# Patient Record
Sex: MALE | Race: WHITE | NOT HISPANIC OR LATINO | Employment: FULL TIME | URBAN - METROPOLITAN AREA
[De-identification: names, ages, dates, MRNs, and addresses within clinical notes are randomized per-mention and may not be internally consistent; named-entity substitution may affect disease eponyms.]

---

## 2019-04-19 ENCOUNTER — OFFICE VISIT (OUTPATIENT)
Dept: URGENT CARE | Facility: CLINIC | Age: 29
End: 2019-04-19
Payer: COMMERCIAL

## 2019-04-19 VITALS
RESPIRATION RATE: 16 BRPM | OXYGEN SATURATION: 97 % | SYSTOLIC BLOOD PRESSURE: 141 MMHG | WEIGHT: 227 LBS | DIASTOLIC BLOOD PRESSURE: 79 MMHG | HEIGHT: 75 IN | TEMPERATURE: 103 F | BODY MASS INDEX: 28.23 KG/M2 | HEART RATE: 90 BPM

## 2019-04-19 DIAGNOSIS — J02.9 SORE THROAT: ICD-10-CM

## 2019-04-19 DIAGNOSIS — J02.9 ACUTE PHARYNGITIS, UNSPECIFIED ETIOLOGY: Primary | ICD-10-CM

## 2019-04-19 DIAGNOSIS — R50.9 FEVER, UNSPECIFIED FEVER CAUSE: ICD-10-CM

## 2019-04-19 LAB
CTP QC/QA: YES
FLUAV AG NPH QL: NEGATIVE
FLUBV AG NPH QL: NEGATIVE
HETEROPH AB SER QL: NEGATIVE
S PYO RRNA THROAT QL PROBE: NEGATIVE

## 2019-04-19 PROCEDURE — 3008F PR BODY MASS INDEX (BMI) DOCUMENTED: ICD-10-PCS | Mod: CPTII,S$GLB,, | Performed by: EMERGENCY MEDICINE

## 2019-04-19 PROCEDURE — 86308 POCT INFECTIOUS MONONUCLEOSIS: ICD-10-PCS | Mod: QW,S$GLB,, | Performed by: EMERGENCY MEDICINE

## 2019-04-19 PROCEDURE — 87880 POCT RAPID STREP A: ICD-10-PCS | Mod: QW,S$GLB,, | Performed by: EMERGENCY MEDICINE

## 2019-04-19 PROCEDURE — 86308 HETEROPHILE ANTIBODY SCREEN: CPT | Mod: QW,S$GLB,, | Performed by: EMERGENCY MEDICINE

## 2019-04-19 PROCEDURE — 87880 STREP A ASSAY W/OPTIC: CPT | Mod: QW,S$GLB,, | Performed by: EMERGENCY MEDICINE

## 2019-04-19 PROCEDURE — 99203 PR OFFICE/OUTPT VISIT, NEW, LEVL III, 30-44 MIN: ICD-10-PCS | Mod: 25,S$GLB,, | Performed by: EMERGENCY MEDICINE

## 2019-04-19 PROCEDURE — 96372 PR INJECTION,THERAP/PROPH/DIAG2ST, IM OR SUBCUT: ICD-10-PCS | Mod: S$GLB,,, | Performed by: EMERGENCY MEDICINE

## 2019-04-19 PROCEDURE — 96372 THER/PROPH/DIAG INJ SC/IM: CPT | Mod: S$GLB,,, | Performed by: EMERGENCY MEDICINE

## 2019-04-19 PROCEDURE — 99203 OFFICE O/P NEW LOW 30 MIN: CPT | Mod: 25,S$GLB,, | Performed by: EMERGENCY MEDICINE

## 2019-04-19 PROCEDURE — 87804 POCT INFLUENZA A/B: ICD-10-PCS | Mod: 59,QW,S$GLB, | Performed by: EMERGENCY MEDICINE

## 2019-04-19 PROCEDURE — 87804 INFLUENZA ASSAY W/OPTIC: CPT | Mod: QW,S$GLB,, | Performed by: EMERGENCY MEDICINE

## 2019-04-19 PROCEDURE — 3008F BODY MASS INDEX DOCD: CPT | Mod: CPTII,S$GLB,, | Performed by: EMERGENCY MEDICINE

## 2019-04-19 RX ORDER — IBUPROFEN 200 MG
600 TABLET ORAL
Status: COMPLETED | OUTPATIENT
Start: 2019-04-19 | End: 2019-04-19

## 2019-04-19 RX ORDER — BETAMETHASONE SODIUM PHOSPHATE AND BETAMETHASONE ACETATE 3; 3 MG/ML; MG/ML
9 INJECTION, SUSPENSION INTRA-ARTICULAR; INTRALESIONAL; INTRAMUSCULAR; SOFT TISSUE
Status: COMPLETED | OUTPATIENT
Start: 2019-04-19 | End: 2019-04-19

## 2019-04-19 RX ADMIN — BETAMETHASONE SODIUM PHOSPHATE AND BETAMETHASONE ACETATE 9 MG: 3; 3 INJECTION, SUSPENSION INTRA-ARTICULAR; INTRALESIONAL; INTRAMUSCULAR; SOFT TISSUE at 11:04

## 2019-04-19 RX ADMIN — Medication 600 MG: at 11:04

## 2019-04-19 NOTE — PATIENT INSTRUCTIONS
Rest and hydrate with plenty of fluids  Celestone shot 1.5 cc given in clinic  Swabs and tests have been negative in clinic.  Take ibuprofen 600 mg every 6 hr for headache, sore throat, joint pain  Okay to take Tylenol 650 mg every 4-6 hours for fever, headache, joint pain  Only fill the antibiotic prescription if not very much improved in 2-3 days with above treatment  See viral pharyngitis sheet        Viral Pharyngitis (Sore Throat)    You (or your child, if your child is the patient) have pharyngitis (sore throat). This infection is caused by a virus. It can cause throat pain that is worse when swallowing, aching all over, headache, and fever. The infection may be spread by coughing, kissing, or touching others after touching your mouth or nose. Antibiotic medications do not work against viruses, so they are not used for treating this condition.  Home care  · If your symptoms are severe, rest at home. Return to work or school when you feel well enough.   · Drink plenty of fluids to avoid dehydration.  · For children: Use acetaminophen for fever, fussiness or discomfort. In infants over six months of age, you may use ibuprofen instead of acetaminophen. (NOTE: If your child has chronic liver or kidney disease or ever had a stomach ulcer or GI bleeding, talk with your doctor before using these medicines.) (NOTE: Aspirin should never be used in anyone under 18 years of age who is ill with a fever. It may cause severe liver damage.)   · For adults: You may use acetaminophen or ibuprofen to control pain or fever, unless another medicine was prescribed for this. (NOTE: If you have chronic liver or kidney disease or ever had a stomach ulcer or GI bleeding, talk with your doctor before using these medicines.)  · Throat lozenges or numbing throat sprays can help reduce pain. Gargling with warm salt water will also help reduce throat pain. For this, dissolve 1/2 teaspoon of salt in 1 glass of warm water. To help soothe a  sore throat, children can sip on juice or a popsicle. Children 5 years and older can also suck on a lollipop or hard candy.  · Avoid salty or spicy foods, which can be irritating to the throat.  Follow-up care  Follow up with your healthcare provider or our staff if you are not improving over the next week.  When to seek medical advice  Call your healthcare provider right away if any of these occur:  · Fever as directed by your doctor.  For children, seek care if:  ¨ Your child is of any age and has repeated fevers above 104°F (40°C).  ¨ Your child is younger than 2 years of age and has a fever of 100.4°F (38°C) that continues for more than 1 day.  ¨ Your child is 2 years old or older and has a fever of 100.4°F (38°C) that continues for more than 3 days.  · New or worsening ear pain, sinus pain, or headache  · Painful lumps in the back of neck  · Stiff neck  · Lymph nodes are getting larger  · Inability to swallow liquids, excessive drooling, or inability to open mouth wide due to throat pain  · Signs of dehydration (very dark urine or no urine, sunken eyes, dizziness)  · Trouble breathing or noisy breathing  · Muffled voice  · New rash  · Child appears to be getting sicker  Date Last Reviewed: 4/13/2015  © 7891-6324 [employer]. [address] [place] [address], [place] [address]. All rights reserved. This information is not intended as a substitute for professional medical care. Always follow your healthcare professional's instructions.

## 2019-04-19 NOTE — PROGRESS NOTES
"Subjective:       Patient ID: Jeremi Azul is a 28 y.o. male.    Vitals:  height is 6' 3" (1.905 m) and weight is 103 kg (227 lb). His temperature is 103 °F (39.4 °C) (abnormal). His blood pressure is 141/79 (abnormal) and his pulse is 90. His respiration is 16 and oxygen saturation is 97%.     Chief Complaint: Sore Throat    Pt c/o sore throat along with fever   Onset four days.  The patient's wife has been sick and was diagnosis with viral pharyngitis and is improving slowly.  She also swab negative for strep.  She did not have as high of fever.  He reports sore throat on Monday that progressed to Tuesday and Wednesday when he developed a very low-grade fever that improved with ibuprofen.  The sore throat has continued and then last night and this morning is when he developed a high fever with headache and chills and body aches.  No weakness or near syncope no chest pain no shortness of breath.  To note, the patient denies lymph node swelling and any significant runny nose or cough.    Sore Throat    This is a new problem. Episode onset: four days. The problem has been gradually worsening. The maximum temperature recorded prior to his arrival was 103 - 104 F. The patient is experiencing no pain. Associated symptoms include coughing and headaches. Pertinent negatives include no congestion, ear pain, shortness of breath, stridor or vomiting. He has tried NSAIDs for the symptoms. The treatment provided no relief.       Constitution: Positive for fever. Negative for chills, sweating and fatigue.   HENT: Positive for sore throat. Negative for ear pain, congestion, sinus pain, sinus pressure and voice change.    Neck: Negative for painful lymph nodes.   Eyes: Negative for eye redness.   Respiratory: Positive for cough. Negative for chest tightness, sputum production, bloody sputum, COPD, shortness of breath, stridor, wheezing and asthma.    Gastrointestinal: Negative for nausea and vomiting.   Musculoskeletal: " Negative for muscle ache.   Skin: Negative for rash.   Allergic/Immunologic: Negative for seasonal allergies and asthma.   Neurological: Positive for headaches.   Hematologic/Lymphatic: Negative for swollen lymph nodes.       Objective:      Physical Exam   Constitutional: He is oriented to person, place, and time. He appears well-developed and well-nourished. He is cooperative.  Non-toxic appearance. He does not appear ill. No distress.   HENT:   Head: Normocephalic and atraumatic.   Right Ear: Hearing, tympanic membrane, external ear and ear canal normal.   Left Ear: Hearing, tympanic membrane, external ear and ear canal normal.   Nose: Nose normal. No mucosal edema, rhinorrhea or nasal deformity. No epistaxis. Right sinus exhibits no maxillary sinus tenderness and no frontal sinus tenderness. Left sinus exhibits no maxillary sinus tenderness and no frontal sinus tenderness.   Mouth/Throat: Uvula is midline and mucous membranes are normal. No trismus in the jaw. Normal dentition. No uvula swelling. No oropharyngeal exudate or posterior oropharyngeal erythema.   Posterior or pharyngeal erythema.   Eyes: Conjunctivae and lids are normal. No scleral icterus.   Sclera clear bilat   Neck: Trachea normal, full passive range of motion without pain and phonation normal. Neck supple.   Cardiovascular: Regular rhythm, normal heart sounds, intact distal pulses and normal pulses.   Tachycardic, febrile   Pulmonary/Chest: Effort normal and breath sounds normal. No respiratory distress.   Abdominal: Soft. Normal appearance and bowel sounds are normal. There is no tenderness.   Musculoskeletal: Normal range of motion. He exhibits no edema or deformity.   Neurological: He is alert and oriented to person, place, and time. He exhibits normal muscle tone. Coordination normal.   Skin: Skin is warm, dry and intact. He is not diaphoretic. No pallor.   Psychiatric: He has a normal mood and affect. His speech is normal and behavior is  normal. Cognition and memory are normal.   Nursing note and vitals reviewed.      Office Visit on 04/19/2019   Component Date Value Ref Range Status    Rapid Strep A Screen 04/19/2019 Negative  Negative Final     Acceptable 04/19/2019 Yes   Final    Rapid Influenza A Ag 04/19/2019 Negative  Negative Final    Rapid Influenza B Ag 04/19/2019 Negative  Negative Final     Acceptable 04/19/2019 Yes   Final    Monospot 04/19/2019 Negative  Negative Final     Acceptable 04/19/2019 Yes   Final       Monospot negative  Flu swab negative  Assessment:       1. Acute pharyngitis, unspecified etiology    2. Sore throat    3. Fever, unspecified fever cause        Plan:         Acute pharyngitis, unspecified etiology    Sore throat  -     POCT rapid strep A  -     POCT Influenza A/B  -     POCT Infectious mononucleosis antibody    Fever, unspecified fever cause    Other orders  -     ibuprofen tablet 600 mg  -     betamethasone acetate-betamethasone sodium phosphate injection 9 mg          Patient Instructions   Rest and hydrate with plenty of fluids  Celestone shot 1.5 cc given in clinic  Swabs and tests have been negative in clinic.  Take ibuprofen 600 mg every 6 hr for headache, sore throat, joint pain  Okay to take Tylenol 650 mg every 4-6 hours for fever, headache, joint pain  Only fill the antibiotic prescription if not very much improved in 2-3 days with above treatment  See viral pharyngitis sheet        Viral Pharyngitis (Sore Throat)    You (or your child, if your child is the patient) have pharyngitis (sore throat). This infection is caused by a virus. It can cause throat pain that is worse when swallowing, aching all over, headache, and fever. The infection may be spread by coughing, kissing, or touching others after touching your mouth or nose. Antibiotic medications do not work against viruses, so they are not used for treating this condition.  Home care  · If your  symptoms are severe, rest at home. Return to work or school when you feel well enough.   · Drink plenty of fluids to avoid dehydration.  · For children: Use acetaminophen for fever, fussiness or discomfort. In infants over six months of age, you may use ibuprofen instead of acetaminophen. (NOTE: If your child has chronic liver or kidney disease or ever had a stomach ulcer or GI bleeding, talk with your doctor before using these medicines.) (NOTE: Aspirin should never be used in anyone under 18 years of age who is ill with a fever. It may cause severe liver damage.)   · For adults: You may use acetaminophen or ibuprofen to control pain or fever, unless another medicine was prescribed for this. (NOTE: If you have chronic liver or kidney disease or ever had a stomach ulcer or GI bleeding, talk with your doctor before using these medicines.)  · Throat lozenges or numbing throat sprays can help reduce pain. Gargling with warm salt water will also help reduce throat pain. For this, dissolve 1/2 teaspoon of salt in 1 glass of warm water. To help soothe a sore throat, children can sip on juice or a popsicle. Children 5 years and older can also suck on a lollipop or hard candy.  · Avoid salty or spicy foods, which can be irritating to the throat.  Follow-up care  Follow up with your healthcare provider or our staff if you are not improving over the next week.  When to seek medical advice  Call your healthcare provider right away if any of these occur:  · Fever as directed by your doctor.  For children, seek care if:  ¨ Your child is of any age and has repeated fevers above 104°F (40°C).  ¨ Your child is younger than 2 years of age and has a fever of 100.4°F (38°C) that continues for more than 1 day.  ¨ Your child is 2 years old or older and has a fever of 100.4°F (38°C) that continues for more than 3 days.  · New or worsening ear pain, sinus pain, or headache  · Painful lumps in the back of neck  · Stiff neck  · Lymph nodes  are getting larger  · Inability to swallow liquids, excessive drooling, or inability to open mouth wide due to throat pain  · Signs of dehydration (very dark urine or no urine, sunken eyes, dizziness)  · Trouble breathing or noisy breathing  · Muffled voice  · New rash  · Child appears to be getting sicker  Date Last Reviewed: 4/13/2015  © 3689-4877 [employer] [place]. [address] [place] [address], [place] [address]. All rights reserved. This information is not intended as a substitute for professional medical care. Always follow your healthcare professional's instructions.

## 2020-12-07 ENCOUNTER — CLINICAL SUPPORT (OUTPATIENT)
Dept: URGENT CARE | Facility: CLINIC | Age: 30
End: 2020-12-07
Payer: COMMERCIAL

## 2020-12-07 VITALS — TEMPERATURE: 99 F

## 2020-12-07 DIAGNOSIS — U07.1 COVID-19: Primary | ICD-10-CM

## 2020-12-07 LAB
CTP QC/QA: YES
SARS-COV-2 RDRP RESP QL NAA+PROBE: NEGATIVE

## 2020-12-07 PROCEDURE — U0002 COVID-19 LAB TEST NON-CDC: HCPCS | Mod: QW,S$GLB,, | Performed by: PHYSICIAN ASSISTANT

## 2020-12-07 PROCEDURE — U0002: ICD-10-PCS | Mod: QW,S$GLB,, | Performed by: PHYSICIAN ASSISTANT

## 2020-12-07 NOTE — PATIENT INSTRUCTIONS
"Your test was NEGATIVE for COVID-19 (coronavirus).      You may leave home and/or return to work when the following conditions are met:   24 hours fever free without fever-reducing medications AND   Improved symptoms   You have not met the conditions of a closed exposure       A "close exposure" is defined as anyone who has had an exposure (masked or unmasked) to a known COVID -19 positive person within 6 ft for longer than 15 minutes. If your exposure meets this definition you are required by CDC guidelines to quarantine for 14 days from time of exposure regardless of test status.      Additional instructions:  · Social distance per your local guidelines  · Call ahead before visiting your doctor.  · Wear a facemask when around others who do not live in your household.  · Cover your coughs and sneezes.  · Wash your hands often with soap and water; hand  can be used, too.      If your symptoms worsen or if you have any other concerns, please contact [facility] at [phone].     Sincerely,    Angela Ryan MA    "

## 2021-02-25 ENCOUNTER — CLINICAL SUPPORT (OUTPATIENT)
Dept: URGENT CARE | Facility: CLINIC | Age: 31
End: 2021-02-25
Payer: COMMERCIAL

## 2021-02-25 VITALS — TEMPERATURE: 97 F

## 2021-02-25 DIAGNOSIS — Z11.52 ENCOUNTER FOR SCREENING FOR COVID-19: Primary | ICD-10-CM

## 2021-02-25 LAB
CTP QC/QA: YES
SARS-COV-2 RDRP RESP QL NAA+PROBE: NEGATIVE

## 2021-02-25 PROCEDURE — U0002: ICD-10-PCS | Mod: QW,S$GLB,, | Performed by: NURSE PRACTITIONER

## 2021-02-25 PROCEDURE — U0002 COVID-19 LAB TEST NON-CDC: HCPCS | Mod: QW,S$GLB,, | Performed by: NURSE PRACTITIONER

## 2021-04-09 ENCOUNTER — IMMUNIZATION (OUTPATIENT)
Dept: FAMILY MEDICINE | Facility: CLINIC | Age: 31
End: 2021-04-09
Payer: COMMERCIAL

## 2021-04-09 DIAGNOSIS — Z23 NEED FOR VACCINATION: Primary | ICD-10-CM

## 2021-04-09 PROCEDURE — 91300 COVID-19, MRNA, LNP-S, PF, 30 MCG/0.3 ML DOSE VACCINE: CPT | Mod: PBBFAC | Performed by: FAMILY MEDICINE

## 2021-04-30 ENCOUNTER — IMMUNIZATION (OUTPATIENT)
Dept: FAMILY MEDICINE | Facility: CLINIC | Age: 31
End: 2021-04-30
Payer: COMMERCIAL

## 2021-04-30 DIAGNOSIS — Z23 NEED FOR VACCINATION: Primary | ICD-10-CM

## 2021-04-30 PROCEDURE — 91300 COVID-19, MRNA, LNP-S, PF, 30 MCG/0.3 ML DOSE VACCINE: CPT | Mod: PBBFAC | Performed by: NURSE ANESTHETIST, CERTIFIED REGISTERED

## 2021-04-30 PROCEDURE — 0002A COVID-19, MRNA, LNP-S, PF, 30 MCG/0.3 ML DOSE VACCINE: CPT | Mod: PBBFAC | Performed by: NURSE ANESTHETIST, CERTIFIED REGISTERED

## 2023-07-10 PROBLEM — G43.909 MIGRAINE: Status: ACTIVE | Noted: 2023-07-10

## 2023-07-11 NOTE — PROGRESS NOTES
"Chart has been dictated using voice recognition software.  It is not been reviewed carefully for any transcriptional errors due to this technology.   Subjective:   Patient ID:  Jeremi Azul is a 32 y.o. male who presents for evaluation of No chief complaint on file.      HPI:  Patient with family history of cardiac disease and migraine headaches is self-referred for evaluation of high heart rate.    Patient notes resting heart rates to 1340 frequently at rest with slow recover.  Not associated with chest discomfort.  No palpitations.   Patient denies any dyspnea at rest or on exertion, orthopnea, PND, or edema.  Patient denies any lightheadedness or syncope.  When he works out ("not very often") he notes his heart rate will increase and the force will increase so that he feels palpitaitons.  Will a feeling of palpitations with weaknes last a sec or 2 every couple of months     Cardiac risk factors: positive family history (for tachyarrhythmias requiring ablation as well as early CAD)    Patient denies any smoking or drug use.    No past medical history on file.    Past Surgical History:   Procedure Laterality Date    none         Social History     Tobacco Use    Smoking status: Never    Smokeless tobacco: Never   Substance Use Topics    Alcohol use: No       Outpatient Medications Prior to Visit   Medication Sig Dispense Refill    sumatriptan 5 mg/actuation Spry 5 mg by Nasal route daily as needed (may repeat one time 2 hours later with maximum 10 mg per day). 1 each 0    verapamil (CALAN) 40 MG Tab Take 1 tablet (40 mg total) by mouth once daily. 30 tablet 3     No facility-administered medications prior to visit.       Review of patient's allergies indicates:  No Known Allergies    ROS - The patient's review of systems is negative for any significant constitutional, respiratory, endocrine, hematologic, musculoskeletal or neurologic symptoms.   Objective:   Physical Exam    Lab Results   Component Value " Date    WBC 5.33 07/19/2016    HGB 15.8 07/19/2016    HCT 45.4 07/19/2016    MCV 80 (L) 07/19/2016     07/19/2016       Lab Results   Component Value Date     07/19/2016    K 4.7 07/19/2016    BUN 12 07/19/2016    CREATININE 1.1 07/19/2016    GLU 67 (L) 07/19/2016    HGB 15.8 07/19/2016    HCT 45.4 07/19/2016     07/19/2016     ECG (today) showed normal sinus rhythm and was a normal EKG.  Assessment:     1. Tachycardia    2. Family history of heart disease    3. Migraine without status migrainosus, not intractable, unspecified migraine type      Patient presents with tachycardia of unknown etiology that occurs essentially with exercise.  This may be due to deconditioning.  His cardiac exam does not reveal any obvious abnormalities.  His ECG is normal.  Therefore, the patient will be placed on a Holter monitor to get objective evidence of what his heart rate does.  He has been instructed to keep a very good diary, particularly trying to correlate his heart rate by his computer watch his heart rate on his Holter monitor.  Additionally, given the patient's concern for being at risk of cardiovascular disease with the family history of early coronary disease, the patient will be sent for fasting lipid profile as well as CBC and CMP.  Cardiac risk factor management will be implemented depending on the results of those tests.  It should be noted the patient no longer has migraine headaches and has stopped all medications.    Unless there are intervening problems, patient should return for re-evaluation in 6 weeks.       Plan:     Diagnoses and all orders for this visit:    Tachycardia    Family history of heart disease    Migraine without status migrainosus, not intractable, unspecified migraine type          Maged Aguilar MD  Consultative Cardiology

## 2023-07-14 ENCOUNTER — OFFICE VISIT (OUTPATIENT)
Dept: CARDIOLOGY | Facility: CLINIC | Age: 33
End: 2023-07-14
Payer: COMMERCIAL

## 2023-07-14 VITALS
SYSTOLIC BLOOD PRESSURE: 126 MMHG | DIASTOLIC BLOOD PRESSURE: 76 MMHG | WEIGHT: 235.88 LBS | HEIGHT: 74 IN | OXYGEN SATURATION: 96 % | BODY MASS INDEX: 30.27 KG/M2 | HEART RATE: 70 BPM

## 2023-07-14 DIAGNOSIS — Z82.49 FAMILY HISTORY OF HEART DISEASE: ICD-10-CM

## 2023-07-14 DIAGNOSIS — G43.909 MIGRAINE WITHOUT STATUS MIGRAINOSUS, NOT INTRACTABLE, UNSPECIFIED MIGRAINE TYPE: ICD-10-CM

## 2023-07-14 DIAGNOSIS — R00.0 TACHYCARDIA: Primary | ICD-10-CM

## 2023-07-14 PROCEDURE — 3008F BODY MASS INDEX DOCD: CPT | Mod: CPTII,S$GLB,, | Performed by: INTERNAL MEDICINE

## 2023-07-14 PROCEDURE — 93000 EKG 12-LEAD: ICD-10-PCS | Mod: S$GLB,,, | Performed by: INTERNAL MEDICINE

## 2023-07-14 PROCEDURE — 3074F PR MOST RECENT SYSTOLIC BLOOD PRESSURE < 130 MM HG: ICD-10-PCS | Mod: CPTII,S$GLB,, | Performed by: INTERNAL MEDICINE

## 2023-07-14 PROCEDURE — 3008F PR BODY MASS INDEX (BMI) DOCUMENTED: ICD-10-PCS | Mod: CPTII,S$GLB,, | Performed by: INTERNAL MEDICINE

## 2023-07-14 PROCEDURE — 3078F DIAST BP <80 MM HG: CPT | Mod: CPTII,S$GLB,, | Performed by: INTERNAL MEDICINE

## 2023-07-14 PROCEDURE — 1160F RVW MEDS BY RX/DR IN RCRD: CPT | Mod: CPTII,S$GLB,, | Performed by: INTERNAL MEDICINE

## 2023-07-14 PROCEDURE — 1159F PR MEDICATION LIST DOCUMENTED IN MEDICAL RECORD: ICD-10-PCS | Mod: CPTII,S$GLB,, | Performed by: INTERNAL MEDICINE

## 2023-07-14 PROCEDURE — 3078F PR MOST RECENT DIASTOLIC BLOOD PRESSURE < 80 MM HG: ICD-10-PCS | Mod: CPTII,S$GLB,, | Performed by: INTERNAL MEDICINE

## 2023-07-14 PROCEDURE — 3074F SYST BP LT 130 MM HG: CPT | Mod: CPTII,S$GLB,, | Performed by: INTERNAL MEDICINE

## 2023-07-14 PROCEDURE — 93000 ELECTROCARDIOGRAM COMPLETE: CPT | Mod: S$GLB,,, | Performed by: INTERNAL MEDICINE

## 2023-07-14 PROCEDURE — 99204 PR OFFICE/OUTPT VISIT, NEW, LEVL IV, 45-59 MIN: ICD-10-PCS | Mod: S$GLB,,, | Performed by: INTERNAL MEDICINE

## 2023-07-14 PROCEDURE — 99204 OFFICE O/P NEW MOD 45 MIN: CPT | Mod: S$GLB,,, | Performed by: INTERNAL MEDICINE

## 2023-07-14 PROCEDURE — 1160F PR REVIEW ALL MEDS BY PRESCRIBER/CLIN PHARMACIST DOCUMENTED: ICD-10-PCS | Mod: CPTII,S$GLB,, | Performed by: INTERNAL MEDICINE

## 2023-07-14 PROCEDURE — 99999 PR PBB SHADOW E&M-EST. PATIENT-LVL III: CPT | Mod: PBBFAC,,, | Performed by: INTERNAL MEDICINE

## 2023-07-14 PROCEDURE — 99999 PR PBB SHADOW E&M-EST. PATIENT-LVL III: ICD-10-PCS | Mod: PBBFAC,,, | Performed by: INTERNAL MEDICINE

## 2023-07-14 PROCEDURE — 1159F MED LIST DOCD IN RCRD: CPT | Mod: CPTII,S$GLB,, | Performed by: INTERNAL MEDICINE

## 2023-07-14 NOTE — PATIENT INSTRUCTIONS
You need to get your cholesterol checked after a 12 hour fast.   No food or drink other than water, and any medication that needs to be taken, for 12 hours prior to the blood test.  You can eat as soon as you want after the sample blood sample is taken.

## 2023-07-14 NOTE — Clinical Note
Thank you for referring Jeremi Azul for evaluation of tachycardia. Please see my note for details of this encounter. If you have any questions, please contact me.  Thank you again for the referral.

## 2023-07-17 DIAGNOSIS — R00.0 TACHYCARDIA: Primary | ICD-10-CM

## 2023-07-18 ENCOUNTER — LAB VISIT (OUTPATIENT)
Dept: LAB | Facility: HOSPITAL | Age: 33
End: 2023-07-18
Attending: INTERNAL MEDICINE
Payer: COMMERCIAL

## 2023-07-18 DIAGNOSIS — R00.0 TACHYCARDIA: ICD-10-CM

## 2023-07-18 LAB
ALBUMIN SERPL BCP-MCNC: 4.2 G/DL (ref 3.5–5.2)
ALP SERPL-CCNC: 61 U/L (ref 55–135)
ALT SERPL W/O P-5'-P-CCNC: 34 U/L (ref 10–44)
ANION GAP SERPL CALC-SCNC: 10 MMOL/L (ref 8–16)
AST SERPL-CCNC: 19 U/L (ref 10–40)
BASOPHILS # BLD AUTO: 0.04 K/UL (ref 0–0.2)
BASOPHILS NFR BLD: 1 % (ref 0–1.9)
BILIRUB SERPL-MCNC: 0.7 MG/DL (ref 0.1–1)
BUN SERPL-MCNC: 12 MG/DL (ref 6–20)
CALCIUM SERPL-MCNC: 9.3 MG/DL (ref 8.7–10.5)
CHLORIDE SERPL-SCNC: 107 MMOL/L (ref 95–110)
CHOLEST SERPL-MCNC: 183 MG/DL (ref 120–199)
CHOLEST/HDLC SERPL: 5.2 {RATIO} (ref 2–5)
CO2 SERPL-SCNC: 25 MMOL/L (ref 23–29)
CREAT SERPL-MCNC: 1.1 MG/DL (ref 0.5–1.4)
DIFFERENTIAL METHOD: NORMAL
EOSINOPHIL # BLD AUTO: 0.1 K/UL (ref 0–0.5)
EOSINOPHIL NFR BLD: 2.4 % (ref 0–8)
ERYTHROCYTE [DISTWIDTH] IN BLOOD BY AUTOMATED COUNT: 12.8 % (ref 11.5–14.5)
EST. GFR  (NO RACE VARIABLE): >60 ML/MIN/1.73 M^2
GLUCOSE SERPL-MCNC: 97 MG/DL (ref 70–110)
HCT VFR BLD AUTO: 44.7 % (ref 40–54)
HDLC SERPL-MCNC: 35 MG/DL (ref 40–75)
HDLC SERPL: 19.1 % (ref 20–50)
HGB BLD-MCNC: 15.1 G/DL (ref 14–18)
IMM GRANULOCYTES # BLD AUTO: 0.01 K/UL (ref 0–0.04)
IMM GRANULOCYTES NFR BLD AUTO: 0.2 % (ref 0–0.5)
LDLC SERPL CALC-MCNC: 133.4 MG/DL (ref 63–159)
LYMPHOCYTES # BLD AUTO: 1.4 K/UL (ref 1–4.8)
LYMPHOCYTES NFR BLD: 32.7 % (ref 18–48)
MCH RBC QN AUTO: 27.7 PG (ref 27–31)
MCHC RBC AUTO-ENTMCNC: 33.8 G/DL (ref 32–36)
MCV RBC AUTO: 82 FL (ref 82–98)
MONOCYTES # BLD AUTO: 0.4 K/UL (ref 0.3–1)
MONOCYTES NFR BLD: 8.9 % (ref 4–15)
NEUTROPHILS # BLD AUTO: 2.3 K/UL (ref 1.8–7.7)
NEUTROPHILS NFR BLD: 54.8 % (ref 38–73)
NONHDLC SERPL-MCNC: 148 MG/DL
NRBC BLD-RTO: 0 /100 WBC
PLATELET # BLD AUTO: 216 K/UL (ref 150–450)
PMV BLD AUTO: 9.4 FL (ref 9.2–12.9)
POTASSIUM SERPL-SCNC: 4.1 MMOL/L (ref 3.5–5.1)
PROT SERPL-MCNC: 7.4 G/DL (ref 6–8.4)
RBC # BLD AUTO: 5.46 M/UL (ref 4.6–6.2)
SODIUM SERPL-SCNC: 142 MMOL/L (ref 136–145)
TRIGL SERPL-MCNC: 73 MG/DL (ref 30–150)
WBC # BLD AUTO: 4.16 K/UL (ref 3.9–12.7)

## 2023-07-18 PROCEDURE — 80053 COMPREHEN METABOLIC PANEL: CPT | Performed by: INTERNAL MEDICINE

## 2023-07-18 PROCEDURE — 85025 COMPLETE CBC W/AUTO DIFF WBC: CPT | Performed by: INTERNAL MEDICINE

## 2023-07-18 PROCEDURE — 36415 COLL VENOUS BLD VENIPUNCTURE: CPT | Performed by: INTERNAL MEDICINE

## 2023-07-18 PROCEDURE — 80061 LIPID PANEL: CPT | Performed by: INTERNAL MEDICINE

## 2023-11-18 ENCOUNTER — OFFICE VISIT (OUTPATIENT)
Dept: URGENT CARE | Facility: CLINIC | Age: 33
End: 2023-11-18
Payer: COMMERCIAL

## 2023-11-18 VITALS
OXYGEN SATURATION: 96 % | HEIGHT: 74 IN | RESPIRATION RATE: 16 BRPM | WEIGHT: 232.56 LBS | TEMPERATURE: 98 F | SYSTOLIC BLOOD PRESSURE: 129 MMHG | HEART RATE: 84 BPM | BODY MASS INDEX: 29.85 KG/M2 | DIASTOLIC BLOOD PRESSURE: 78 MMHG

## 2023-11-18 DIAGNOSIS — L02.11 ABSCESS, NECK: Primary | ICD-10-CM

## 2023-11-18 PROCEDURE — 10060 INCISION & DRAINAGE: ICD-10-PCS | Mod: S$GLB,,, | Performed by: NURSE PRACTITIONER

## 2023-11-18 PROCEDURE — 99203 PR OFFICE/OUTPT VISIT, NEW, LEVL III, 30-44 MIN: ICD-10-PCS | Mod: 25,S$GLB,, | Performed by: NURSE PRACTITIONER

## 2023-11-18 PROCEDURE — 10060 I&D ABSCESS SIMPLE/SINGLE: CPT | Mod: S$GLB,,, | Performed by: NURSE PRACTITIONER

## 2023-11-18 PROCEDURE — 99203 OFFICE O/P NEW LOW 30 MIN: CPT | Mod: 25,S$GLB,, | Performed by: NURSE PRACTITIONER

## 2023-11-18 RX ORDER — SULFAMETHOXAZOLE AND TRIMETHOPRIM 800; 160 MG/1; MG/1
1 TABLET ORAL 2 TIMES DAILY
Qty: 20 TABLET | Refills: 0 | Status: SHIPPED | OUTPATIENT
Start: 2023-11-18 | End: 2023-11-28

## 2023-11-18 RX ORDER — MUPIROCIN 20 MG/G
OINTMENT TOPICAL 2 TIMES DAILY
Qty: 22 G | Refills: 0 | Status: SHIPPED | OUTPATIENT
Start: 2023-11-18 | End: 2023-11-25

## 2023-11-18 NOTE — PATIENT INSTRUCTIONS
If your condition worsens or fails to improve, we recommend that you receive another evaluation at the ER immediately, contact your PCP to discuss your concerns, or return here.  You must understand that you've received an urgent care treatment only, and that you may be released before all your medical problems are known or treated.  You, the patient, will arrange for follow-up care as instructed.     Use warm compresses for 20 minutes 3-5 times a day.  Avoid picking or manipulating the wound.  Clean the wound twice a day and put antibiotic ointment on it.  You should seek ER treatment if develop fever, increasing pain, swelling, red streaking, or other signs of increasing infection.     If you are female and on birth control pills, use additional methods to prevent pregnancy while on antibiotics and for one cycle after.  If you were given narcotics, do not drive or operate heavy equipment or machinery while taking these medications.     Tylenol or Ibuprofen can also be used as directed for pain unless you have an allergy to them or medical condition (such as stomach ulcers, kidney or liver disease, or use blood thinners, etc.) for which you should not be taking these type of medications.

## 2023-11-18 NOTE — PROGRESS NOTES
"Subjective:      Patient ID: Jeremi Azul is a 33 y.o. male.    Vitals:  height is 6' 2.02" (1.88 m) and weight is 105.5 kg (232 lb 9.4 oz). His oral temperature is 98.3 °F (36.8 °C). His blood pressure is 129/78 and his pulse is 84. His respiration is 16 and oxygen saturation is 96%.     Chief Complaint: Abscess    32yo male pt presents with partner, reports painful reddened abscess to the R side of his neck, worsening over the past 2 weeks.  Reports tenderness to touch, surrounding erythema and swollen skin.  Denies fever/chills.  Denies n/v.  Denies difficulty with swallowing or breathing.  Denies any open areas, but reports small amount of purulent discharge a few days ago.  Denies attempting any medications or treatments at home.    Other  This is a new problem. The current episode started 1 to 4 weeks ago. Pertinent negatives include no abdominal pain, anorexia, arthralgias, change in bowel habit, chest pain, chills, congestion, coughing, diaphoresis, fatigue, fever, headaches, joint swelling, myalgias, nausea, neck pain, numbness, rash, sore throat, swollen glands, urinary symptoms, vertigo, visual change, vomiting or weakness. Nothing aggravates the symptoms. He has tried nothing for the symptoms.       Constitution: Negative for chills, sweating, fatigue and fever.   HENT:  Negative for congestion and sore throat.    Neck: Negative for neck pain.   Cardiovascular:  Negative for chest pain.   Respiratory:  Negative for cough.    Gastrointestinal:  Negative for abdominal pain, nausea and vomiting.   Musculoskeletal:  Negative for joint pain, joint swelling and muscle ache.   Skin:  Positive for skin thickening/induration, erythema and abscess. Negative for rash.   Neurological:  Negative for history of vertigo, headaches and numbness.      Objective:     Physical Exam   Constitutional: He is oriented to person, place, and time. He appears well-developed.   HENT:   Head: Normocephalic and atraumatic. " "Head is without abrasion, without contusion and without laceration.       Ears:   Right Ear: External ear normal.   Left Ear: External ear normal.   Nose: Nose normal.   Mouth/Throat: Oropharynx is clear and moist and mucous membranes are normal.   Eyes: Conjunctivae, EOM and lids are normal. Pupils are equal, round, and reactive to light.   Neck: Trachea normal and phonation normal. Neck supple.   Cardiovascular: Normal rate, regular rhythm and normal heart sounds.   Pulmonary/Chest: Effort normal and breath sounds normal. No stridor. No respiratory distress.   Musculoskeletal: Normal range of motion.         General: Normal range of motion.   Neurological: He is alert and oriented to person, place, and time.   Skin: Skin is warm, dry, intact, no rash and abscessed. Capillary refill takes less than 2 seconds. erythema No abrasion, No burn, No bruising and No ecchymosis   Psychiatric: His speech is normal and behavior is normal. Judgment and thought content normal.   Nursing note and vitals reviewed.              Assessment:     1. Abscess, neck        Plan:     Incision & Drainage    Date/Time: 11/18/2023 11:00 AM    Performed by: Geoff Mondragon NP  Authorized by: Geoff Mondragon NP    Time out: Immediately prior to procedure a "time out" was called to verify the correct patient, procedure, equipment, support staff and site/side marked as required.      Type:  Abscess  Body area:  Head/neck  Location details:  Neck  Anesthesia:  Local infiltration  Local anesthetic: Lidocaine 1% without epinephrine and LET (lido,epi,tetracaine)  Anesthetic total (ml):  10 (10mL of lidocaine 1%, 5mL of LET topical)  Scalpel size:  11  Incision type:  Single straight  Incision depth: subcutaneous    Complexity:  Simple  Drainage:  Pus, bloody and purulent  Drainage amount:  Moderate  Wound treatment:  Incision, drainage and expression of material  Patient tolerance:  Patient tolerated the procedure well with no immediate " complications    During lidocaine injection, abscess opened and started to express purulent drainage, as well as the lidocaine being injected.  Was unable to keep lidocaine from flowing out of area due to drainage.  Used full tube of LET topical for further anesthesia before cutting, pt stated that he was not fully numb, but gave consent to continue.  Single 1cm long incision to center of abscess.  Expressed large amount of purulent and bloody drainage.  Dressed with antibiotic ointment, nonadherent gauze, gauze, and tape with pressure bandage.      Provided education on prescribed medications.  Instructed pt to perform warm compresses at least twice daily, with twice daily dressing changes and cleansing with antibacterial soap, dress with antibiotic ointment and gauze as shown in clinic today.  Provided education on return/ER precautions.  Pt and wife both verbalized understanding and agreed to plan.      Abscess, neck  -     mupirocin (BACTROBAN) 2 % ointment; Apply topically 2 (two) times daily. for 7 days  Dispense: 22 g; Refill: 0  -     sulfamethoxazole-trimethoprim 800-160mg (BACTRIM DS) 800-160 mg Tab; Take 1 tablet by mouth 2 (two) times daily. for 10 days  Dispense: 20 tablet; Refill: 0      Patient Instructions   If your condition worsens or fails to improve, we recommend that you receive another evaluation at the ER immediately, contact your PCP to discuss your concerns, or return here.  You must understand that you've received an urgent care treatment only, and that you may be released before all your medical problems are known or treated.  You, the patient, will arrange for follow-up care as instructed.     Use warm compresses for 20 minutes 3-5 times a day.  Avoid picking or manipulating the wound.  Clean the wound twice a day and put antibiotic ointment on it.  You should seek ER treatment if develop fever, increasing pain, swelling, red streaking, or other signs of increasing infection.     If you  are female and on birth control pills, use additional methods to prevent pregnancy while on antibiotics and for one cycle after.  If you were given narcotics, do not drive or operate heavy equipment or machinery while taking these medications.     Tylenol or Ibuprofen can also be used as directed for pain unless you have an allergy to them or medical condition (such as stomach ulcers, kidney or liver disease, or use blood thinners, etc.) for which you should not be taking these type of medications.

## 2023-11-18 NOTE — PROCEDURES
"Incision & Drainage    Date/Time: 11/18/2023 11:00 AM    Performed by: Geoff Mondragon NP  Authorized by: Geoff Mondragon NP    Time out: Immediately prior to procedure a "time out" was called to verify the correct patient, procedure, equipment, support staff and site/side marked as required.      Type:  Abscess  Body area:  Head/neck  Location details:  Neck  Anesthesia:  Local infiltration  Local anesthetic: Lidocaine 1% without epinephrine and LET (lido,epi,tetracaine)  Anesthetic total (ml):  10 (10mL of lidocaine 1%, 5mL of LET topical)  Scalpel size:  11  Incision type:  Single straight  Incision depth: subcutaneous    Complexity:  Simple  Drainage:  Pus, bloody and purulent  Drainage amount:  Moderate  Wound treatment:  Incision, drainage and expression of material  Patient tolerance:  Patient tolerated the procedure well with no immediate complications    During lidocaine injection, abscess opened and started to express purulent drainage, as well as the lidocaine being injected.  Was unable to keep lidocaine from flowing out of area due to drainage.  Used full tube of LET topical for further anesthesia before cutting, pt stated that he was not fully numb, but gave consent to continue.  Single 1cm long incision to center of abscess.  Expressed large amount of purulent and bloody drainage.  Dressed with antibiotic ointment, nonadherent gauze, gauze, and tape with pressure bandage.    "